# Patient Record
Sex: MALE | Race: WHITE | NOT HISPANIC OR LATINO | URBAN - METROPOLITAN AREA
[De-identification: names, ages, dates, MRNs, and addresses within clinical notes are randomized per-mention and may not be internally consistent; named-entity substitution may affect disease eponyms.]

---

## 2024-04-23 ENCOUNTER — OFFICE VISIT (OUTPATIENT)
Dept: URGENT CARE | Facility: CLINIC | Age: 49
End: 2024-04-23
Payer: COMMERCIAL

## 2024-04-23 VITALS
WEIGHT: 178 LBS | SYSTOLIC BLOOD PRESSURE: 122 MMHG | RESPIRATION RATE: 18 BRPM | HEIGHT: 70 IN | OXYGEN SATURATION: 99 % | HEART RATE: 73 BPM | BODY MASS INDEX: 25.48 KG/M2 | DIASTOLIC BLOOD PRESSURE: 78 MMHG | TEMPERATURE: 97.9 F

## 2024-04-23 DIAGNOSIS — R21 RASH: Primary | ICD-10-CM

## 2024-04-23 PROCEDURE — 99213 OFFICE O/P EST LOW 20 MIN: CPT | Performed by: PHYSICIAN ASSISTANT

## 2024-04-23 RX ORDER — CLOTRIMAZOLE 1 %
CREAM (GRAM) TOPICAL 2 TIMES DAILY
Qty: 30 G | Refills: 0 | Status: SHIPPED | OUTPATIENT
Start: 2024-04-23

## 2024-04-23 RX ORDER — FINGOLIMOD HYDROCHLORIDE 0.5 MG/1
0.5 CAPSULE ORAL DAILY
COMMUNITY
Start: 2024-02-29 | End: 2024-05-29

## 2024-04-23 RX ORDER — ESCITALOPRAM OXALATE 10 MG/1
TABLET ORAL
COMMUNITY
Start: 2024-03-19

## 2024-04-23 NOTE — PROGRESS NOTES
[facility]        NAME: Albino Pitt is a 48 y.o. male  : 1975    MRN: [id number]  DATE: 2024  TIME: 10:33 AM    Assessment and Plan   Rash [R21]  1. Rash  clotrimazole (LOTRIMIN) 1 % cream    Lyme Total AB W Reflex to IGM/IGG    Lyme Total AB W Reflex to IGM/IGG        Pt concerned for Lyme so I ordered lab work. Discussed ringworm is more likely and will treat for that. Follow up with PCP.     Patient Instructions     Patient Instructions   Cream for 2-4 weeks twice a day.       Follow up with PCP in 3-5 days.  Proceed to  ER if symptoms worsen.    Chief Complaint     Chief Complaint   Patient presents with    Rash     Pt here for a rash under left arm, pt states  on going x2 days  area is  red, raised, mild  itch in the area,   no pain.  Pt used No meds.          History of Present Illness       The pt is a 48-year-old male presenting today for a rash under his L axilla x 2 days. The rash is red, raised and itchy. No meds tried. No other symptoms. No recent tick exposures. However, he is concerned for Lyme Disease.         Review of Systems   Review of Systems   Constitutional:  Negative for activity change, appetite change, chills, diaphoresis and fever.   HENT:  Negative for congestion, ear pain, rhinorrhea and sore throat.    Eyes:  Negative for pain and visual disturbance.   Respiratory:  Negative for cough, chest tightness and shortness of breath.    Cardiovascular:  Negative for chest pain and palpitations.   Gastrointestinal:  Negative for abdominal pain, diarrhea, nausea and vomiting.   Genitourinary:  Negative for dysuria and hematuria.   Musculoskeletal:  Negative for arthralgias, back pain and myalgias.   Skin:  Positive for rash. Negative for color change and pallor.   Neurological:  Negative for seizures, syncope and headaches.   All other systems reviewed and are negative.        Current Medications       Current Outpatient Medications:     clotrimazole (LOTRIMIN) 1 %  "cream, Apply topically 2 (two) times a day, Disp: 30 g, Rfl: 0    escitalopram (LEXAPRO) 10 mg tablet, , Disp: , Rfl:     esomeprazole (NexIUM) 20 mg capsule, Take 20 mg by mouth every morning before breakfast, Disp: , Rfl:     fingolimod (GILENYA) 0.5 MG capsule, Take 0.5 mg by mouth daily, Disp: , Rfl:     SUMAtriptan Succinate (IMITREX PO), Take by mouth, Disp: , Rfl:     Current Allergies     Allergies as of 04/23/2024    (No Known Allergies)            The following portions of the patient's history were reviewed and updated as appropriate: allergies, current medications, past family history, past medical history, past social history, past surgical history and problem list.     Past Medical History:   Diagnosis Date    Acid reflux disease     Allergic     H/O hiatal hernia     Migraines     MS (multiple sclerosis) (HCC)     PVC (premature ventricular contraction)        Past Surgical History:   Procedure Laterality Date    PLANTAR FASCIA RELEASE         Family History   Problem Relation Age of Onset    Heart disease Father          Medications have been verified.        Objective   /78   Pulse 73   Temp 97.9 °F (36.6 °C)   Resp 18   Ht 5' 10\" (1.778 m)   Wt 80.7 kg (178 lb)   SpO2 99%   BMI 25.54 kg/m²        Physical Exam     Physical Exam  Vitals and nursing note reviewed.   Constitutional:       Appearance: Normal appearance. He is normal weight. He is not ill-appearing, toxic-appearing or diaphoretic.   Cardiovascular:      Rate and Rhythm: Normal rate and regular rhythm.      Heart sounds: No murmur heard.     No friction rub. No gallop.   Pulmonary:      Effort: Pulmonary effort is normal.      Breath sounds: Normal breath sounds.   Skin:     Comments: Circular rash with a red boarder and central clearing under L axilla. Raised    Neurological:      Mental Status: He is alert.                   "

## 2024-04-24 LAB — B BURGDOR IGG+IGM SER QL IA: NEGATIVE

## 2024-04-25 ENCOUNTER — TELEPHONE (OUTPATIENT)
Dept: URGENT CARE | Facility: MEDICAL CENTER | Age: 49
End: 2024-04-25

## 2024-04-25 NOTE — TELEPHONE ENCOUNTER
Spoke with patient about negative results.  Patient understood.  If patient continues to have concerns follow-up with PCP.    ----- Message from Margret Mahoney MD sent at 4/25/2024 12:35 PM EDT -----  Regarding: RE: Lyme titer result  Thanks. No problem.     ----- Message -----  From: Jeramy Jenkins MD  Sent: 4/25/2024   9:24 AM EDT  To: Margret Mahoney MD  Subject: Lyme titer result                                Pedrito Jenkins here.  Somehow, I got this results in my inbox.  I believe a PA working in the clinic to the office order this test result.  It was negative.  It has your name as ordering physician.  I thought you should know.    Dr. Jenkins  ----- Message -----  From: Eusebio, [facility] Lab Results In  Sent: 4/24/2024   9:56 PM EDT  To: [facility] Provider

## 2025-06-29 ENCOUNTER — APPOINTMENT (OUTPATIENT)
Dept: RADIOLOGY | Facility: CLINIC | Age: 50
End: 2025-06-29
Attending: PHYSICIAN ASSISTANT
Payer: COMMERCIAL

## 2025-06-29 ENCOUNTER — OFFICE VISIT (OUTPATIENT)
Dept: URGENT CARE | Facility: CLINIC | Age: 50
End: 2025-06-29
Payer: COMMERCIAL

## 2025-06-29 VITALS
BODY MASS INDEX: 25.48 KG/M2 | HEIGHT: 70 IN | TEMPERATURE: 96.2 F | RESPIRATION RATE: 18 BRPM | OXYGEN SATURATION: 98 % | DIASTOLIC BLOOD PRESSURE: 90 MMHG | SYSTOLIC BLOOD PRESSURE: 126 MMHG | HEART RATE: 75 BPM | WEIGHT: 178 LBS

## 2025-06-29 DIAGNOSIS — S49.92XA INJURY OF LEFT SHOULDER, INITIAL ENCOUNTER: Primary | ICD-10-CM

## 2025-06-29 DIAGNOSIS — S49.92XA INJURY OF LEFT SHOULDER, INITIAL ENCOUNTER: ICD-10-CM

## 2025-06-29 PROCEDURE — 99213 OFFICE O/P EST LOW 20 MIN: CPT | Performed by: PHYSICIAN ASSISTANT

## 2025-06-29 PROCEDURE — 73030 X-RAY EXAM OF SHOULDER: CPT

## 2025-06-29 RX ORDER — NAPROXEN 500 MG/1
500 TABLET ORAL 2 TIMES DAILY WITH MEALS
Qty: 14 TABLET | Refills: 0 | Status: SHIPPED | OUTPATIENT
Start: 2025-06-29 | End: 2025-07-06

## 2025-06-29 RX ORDER — CYCLOBENZAPRINE HCL 10 MG
10 TABLET ORAL
Qty: 14 TABLET | Refills: 0 | Status: SHIPPED | OUTPATIENT
Start: 2025-06-29

## 2025-06-29 NOTE — PATIENT INSTRUCTIONS
Left shoulder pain:   -There is no obvious sign of dislocation or fracture on X-ray. Awaiting official read.   -Will place in shoulder sling for comfort and support   -Ice the area for 20 minutes 3-4 times a day  -Elevate the area and rest   -You can take the naproxen for pain with meals and your Nexium. Tylenol for the pain breakthrough.   -Avoid strenuous activity/sports or gym until your symptoms improve  -Follow up with Ortho and PT For further evaluation and management

## 2025-06-29 NOTE — PROGRESS NOTES
[facility]        NAME: Albino Pitt is a 49 y.o. male  : 1975    MRN: [id number]  DATE: 2025  TIME: 3:48 PM    Assessment and Plan   Injury of left shoulder, initial encounter [S49.92XA]  1. Injury of left shoulder, initial encounter  XR shoulder 2+ vw left    naproxen (Naprosyn) 500 mg tablet    cyclobenzaprine (FLEXERIL) 10 mg tablet    Ambulatory Referral to Physical Therapy    Ambulatory Referral to Orthopedic Surgery            Patient Instructions   Left shoulder pain:   -There is no obvious sign of dislocation or fracture on X-ray. Awaiting official read.   -Will place in shoulder sling for comfort and support   -Ice the area for 20 minutes 3-4 times a day  -Elevate the area and rest   -You can take the naproxen for pain with meals and your Nexium. Tylenol for the pain breakthrough.   -Avoid strenuous activity/sports or gym until your symptoms improve  -Follow up with Ortho and PT For further evaluation and management       Follow up with PCP in 3-5 days.  Proceed to  ER if symptoms worsen.    If tests have been performed at [facility], our office will contact you with results if changes need to be made to the care plan discussed with you at the visit.  You can [facility] on [facility].    Chief Complaint     Chief Complaint   Patient presents with    Shoulder Injury     Fall resulted in left shoulder pain that has increased in severity since . Has difficulty sleeping on left side          History of Present Illness       The patient presents today for fall injury which resulted in L sided shoulder pain. The fall occurred on 25 and he states that the pain has been worsening. He states that he was sitting in a desk chair, leaned forward and the chair tipped forward. He states that he fell onto an outstretched hand and reports that he needed stitches over his R eyebrow. He states that he has severe pain when he lays on the left side. He has full ROM of  "the shoulder but has pain in all planes. No erythema, edema or ecchymosis. No weakness, numbness or tingling. He has been taking Aleve. no previous injury to the LUE.         Review of Systems   Review of Systems   Constitutional:  Negative for activity change, appetite change, chills, fatigue and fever.   Musculoskeletal:  Positive for arthralgias and joint swelling. Negative for back pain, gait problem, myalgias, neck pain and neck stiffness.   Skin:  Negative for color change, pallor, rash and wound.   Allergic/Immunologic: Negative for immunocompromised state.   Neurological:  Negative for dizziness, weakness, light-headedness and numbness.   Hematological:  Does not bruise/bleed easily.         Current Medications     Current Medications[1]    Current Allergies     Allergies as of 06/29/2025    (No Known Allergies)            The following portions of the patient's history were reviewed and updated as appropriate: allergies, current medications, past family history, past medical history, past social history, past surgical history and problem list.     Past Medical History[2]    Past Surgical History[3]    Family History[4]      Medications have been verified.        Objective   /90   Pulse 75   Temp (!) 96.2 °F (35.7 °C)   Resp 18   Ht 5' 10\" (1.778 m)   Wt 80.7 kg (178 lb)   SpO2 98%   BMI 25.54 kg/m²   No LMP for male patient.       Physical Exam     Physical Exam  Vitals and nursing note reviewed.   Constitutional:       General: He is not in acute distress.     Appearance: He is well-developed. He is not ill-appearing, toxic-appearing or diaphoretic.     Cardiovascular:      Rate and Rhythm: Normal rate and regular rhythm.      Heart sounds: Normal heart sounds, S1 normal and S2 normal.   Pulmonary:      Effort: Pulmonary effort is normal.      Breath sounds: Normal breath sounds and air entry.     Musculoskeletal:      Left shoulder: No swelling, deformity, tenderness, bony tenderness or " crepitus. Normal range of motion. Normal strength. Normal pulse.      Comments: Left shoulder:  There is no /erythema, edema, ecchymosis.  He has full range of motion of his shoulder without significant pain.  He states that the predominant pain is while laying on the left side.  Strength is 5/5 of the upper extremities.  Sensation is intact.     Skin:     General: Skin is warm and dry.      Capillary Refill: Capillary refill takes less than 2 seconds.      Findings: No bruising, ecchymosis or erythema.     Neurological:      General: No focal deficit present.      Mental Status: He is alert and oriented to person, place, and time.      Sensory: Sensation is intact. No sensory deficit.      Motor: Motor function is intact. No weakness or abnormal muscle tone.      Gait: Gait is intact. Gait normal.      Deep Tendon Reflexes: Reflexes are normal and symmetric.     Psychiatric:         Behavior: Behavior normal.                        [1]   Current Outpatient Medications:     cyclobenzaprine (FLEXERIL) 10 mg tablet, Take 1 tablet (10 mg total) by mouth daily at bedtime, Disp: 14 tablet, Rfl: 0    escitalopram (LEXAPRO) 10 mg tablet, , Disp: , Rfl:     esomeprazole (NexIUM) 20 mg capsule, Take 20 mg by mouth every morning before breakfast, Disp: , Rfl:     fingolimod (GILENYA) 0.5 MG capsule, Take 0.5 mg by mouth in the morning., Disp: , Rfl:     naproxen (Naprosyn) 500 mg tablet, Take 1 tablet (500 mg total) by mouth 2 (two) times a day with meals for 7 days, Disp: 14 tablet, Rfl: 0    clotrimazole (LOTRIMIN) 1 % cream, Apply topically 2 (two) times a day (Patient not taking: Reported on 6/29/2025), Disp: 30 g, Rfl: 0    SUMAtriptan Succinate (IMITREX PO), Take by mouth (Patient not taking: Reported on 6/29/2025), Disp: , Rfl:   [2]   Past Medical History:  Diagnosis Date    Acid reflux disease     Allergic     H/O hiatal hernia     Migraines     MS (multiple sclerosis) (HCC)     PVC (premature ventricular  contraction)    [3]   Past Surgical History:  Procedure Laterality Date    PLANTAR FASCIA RELEASE     [4]   Family History  Problem Relation Name Age of Onset    Heart disease Father